# Patient Record
(demographics unavailable — no encounter records)

---

## 2024-11-05 NOTE — PROCEDURE
[Adverse Effects] : no adverse effects [FreeTextEntry1] : 5 units B/L crow's feet  40 units wasted  Botox [id number], 12/2026

## 2024-11-05 NOTE — ASSESSMENT
[FreeTextEntry1] : 51 y/o F with chronic Migraines progressively worsening and now with almost daily headaches interfering on QOL and functioning.  She is currently on Antidepressant and cannot be on TCA.  She is on ARB and BP on lower end and cannot be on betablocker.    -Botox tx performed today w/out AE  - will taper Topamax by 25 mg and monitor response. If no change in headache will decrease to 25 mg q hs, advised of AE  - Ubrelvy no longer effective and has failed multiple triptans, continue Nurtec 75 mg prn as abortive.  -cont Botox.     The patient had the opportunity to ask questions and to provide feedback. All questions were answered accordingly.  The patient verbalized understanding of the management plan.

## 2024-11-05 NOTE — PHYSICAL EXAM
[FreeTextEntry1] : Constitutional: No signs of distress. No signs of toxicity. \par  MS: Alert and well oriented. Speech fluent. No aphasia. Fund of knowledge intact. \par  Psychiatric: Mood stable.\par  CN: PERRLA: No papilledema; No VFC: No Estefany. V1-3 intact. No facial asymmetry. palate elevates symmetrically, tongue midline\par  Motor: Adequate bulk, tone, strength. 5/5 strength\par  DTR: present and symmetrical; no clonus\par  Sensory: intact to primary and secondary modalities; neg Romberg\par  Cerebellar and gait: intact\par  Eyes: no redness or swelling\par  HEENT: intact\par  Neck: No masses noted\par  Pulmonary: no respiratory distress\par  Vascular: no temperature,color changes; no edema\par  Musculoskeletal: examination of the cervical spine reveals no midline tenderness, range of motion full upon flexion, extension and lateral rotation. Negative facet tenderness, \par  Skin: No rash.\par

## 2024-11-05 NOTE — HISTORY OF PRESENT ILLNESS
[FreeTextEntry1] : 51 y/o F with Pmhx Depression, Anxiety, chronic Migraines onset at 15 years old who presents today for follow up.  Since her last visit reports headaches have improved.  She is having headaches 1-2x/day per week on average described as frontal 4-5/10, no photo and phonophobia, no N/V, no dizziness, visual changes. She has been on Topamax 75mg daily. Tried Nurtec and no benefit thus.  Triggers include weather changes, foods.    She is training training 5-6x/week, strength training, " Spartan training", spinning, running 3-4 miles. ( previously 10 miles) .   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  Father recently passed away.

## 2024-11-22 NOTE — CONSULT LETTER
[Dear  ___] : Dear  [unfilled], [Consult Letter:] : I had the pleasure of evaluating your patient, [unfilled]. [Please see my note below.] : Please see my note below. [Sincerely,] : Sincerely, [FreeTextEntry3] : Piyush Canseco MD FACS

## 2024-11-22 NOTE — ASSESSMENT
[FreeTextEntry1] : Hx bilateral implants 2014 Left breast nodule 11:00 - BIRADS 3 imaging 7/2024 Reassured patient and mother that index of suspicion for malignancy is low  Pt is scheduled for reconstruction surgery w/ Dr. Garland of plastic sx  Will review images with [facility] regarding 6 month L US 1/2025 vs biopsy of the L breast nodule  All questions answered Genetic testing performed today

## 2024-11-22 NOTE — PHYSICAL EXAM
[Normal] : supple, no neck mass and thyroid not enlarged [Normal Neck Lymph Nodes] : normal neck lymph nodes  [Normal Supraclavicular Lymph Nodes] : normal supraclavicular lymph nodes [Normal Groin Lymph Nodes] : normal groin lymph nodes [Normal Axillary Lymph Nodes] : normal axillary lymph nodes [Normal] : oriented to person, place and time, with appropriate affect [de-identified] : no masses or adenopathy bilaterally

## 2024-11-22 NOTE — PHYSICAL EXAM
[Normal] : supple, no neck mass and thyroid not enlarged [Normal Neck Lymph Nodes] : normal neck lymph nodes  [Normal Supraclavicular Lymph Nodes] : normal supraclavicular lymph nodes [Normal Groin Lymph Nodes] : normal groin lymph nodes [Normal Axillary Lymph Nodes] : normal axillary lymph nodes [Normal] : oriented to person, place and time, with appropriate affect [de-identified] : no masses or adenopathy bilaterally

## 2024-11-22 NOTE — HISTORY OF PRESENT ILLNESS
[de-identified] : Patient is a 51 y/o F who presents a chief complaint of left breast nodule seen on imaging.  Hx of bilateral saline implants in January 2014. She is scheduled for surgery with Dr. Garland of plastic sx on 12/04/24.  MMG/US 7/25/24 - Newly visualized probably benign hypoechoic lesion in the left breast 11:00 periareolar region for which a 6 month f/u recommended.  (NCI life time risk 8.0%, MMG BIRADS 2, Sono BIRADS 3) Bilateral implants without gross evidence of rupture - eval with breast MRI.  Pmhx Depression, Anxiety, chronic Migraines onset at 15 years old (f/u with neuro) Denies any family hx of breast cancer. Mother had ovarian cancer.

## 2024-11-22 NOTE — REASON FOR VISIT
[Initial Consultation] : an initial consultation for [Abnormal Mammogram] : abnormal mammogram [Parent] : parent

## 2024-11-22 NOTE — ASSESSMENT
[FreeTextEntry1] : Hx bilateral implants 2014 Left breast nodule 11:00 - BIRADS 3 imaging 7/2024 Reassured patient and mother that index of suspicion for malignancy is low  Pt is scheduled for reconstruction surgery w/ Dr. Garland of plastic sx  Will review images with [facility] regarding 6 month L US 1/2025 vs biopsy of the L breast nodule  All questions answered Genetic testing performed today

## 2024-11-22 NOTE — HISTORY OF PRESENT ILLNESS
[de-identified] : Patient is a 49 y/o F who presents a chief complaint of left breast nodule seen on imaging.  Hx of bilateral saline implants in January 2014. She is scheduled for surgery with Dr. Garland of plastic sx on 12/04/24.  MMG/US 7/25/24 - Newly visualized probably benign hypoechoic lesion in the left breast 11:00 periareolar region for which a 6 month f/u recommended.  (NCI life time risk 8.0%, MMG BIRADS 2, Sono BIRADS 3) Bilateral implants without gross evidence of rupture - eval with breast MRI.  Pmhx Depression, Anxiety, chronic Migraines onset at 15 years old (f/u with neuro) Denies any family hx of breast cancer. Mother had ovarian cancer.

## 2024-11-22 NOTE — ADDENDUM
[FreeTextEntry1] : I, Nasra Siegel, acted solely as a scribe for Dr. Piyush Canseco on this date 11/22/2024.

## 2025-02-18 NOTE — PROCEDURE
[Chronic migraine] : Chronic migraine [Consent Signed] : consent signed [BOTOX 200 Units] : BOTOX 200 units; 5 units per muscle site.  procerus x 1, corragator x 2, frontalis x 4, temporalis x 8, occipitalis x 6, cervical  paraspinal x 4 and trapezius x 6 [Adverse Effects] : no adverse effects [FreeTextEntry1] : 5 units B/L crow's feet  40 units wasted  Botox [id number], 05/2027

## 2025-02-18 NOTE — HISTORY OF PRESENT ILLNESS
[FreeTextEntry1] : 51 y/o F with Pmhx Depression, Anxiety, chronic Migraines onset at 15 years old who presents today for follow up.  Since her last visit reports viral infection, fever, chills, cough with significant increase in intensity and frequency of headaches.  She was having headaches 1-2x/day per week on average described as frontal 4-5/10, no photo and phonophobia, no N/V, no dizziness, visual changes. She has been on Topamax 75mg daily. Tried Nurtec and no benefit thus.  Triggers include weather changes, foods.    She is training training 5-6x/week, strength training, " Spartan training", spinning, running 3-4 miles. ( previously 10 miles) .   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  Father recently passed away.

## 2025-02-18 NOTE — ASSESSMENT
[FreeTextEntry1] : 49 y/o F with chronic Migraines progressively worsening and now with almost daily headaches interfering on QOL and functioning.  She is currently on Antidepressant and cannot be on TCA.  She is on ARB and BP on lower end and cannot be on betablocker.    -Botox tx performed today w/out AE  nabumetone 750 mg 2x/day with meals x 7-10 days then prn.  - will taper Topamax by 25 mg and monitor response. If no change in headache will decrease to 25 mg q hs, advised of AE  - Ubrelvy no longer effective and has failed multiple triptans, continue Nurtec 75 mg prn as abortive.  -cont Botox.     The patient had the opportunity to ask questions and to provide feedback. All questions were answered accordingly.  The patient verbalized understanding of the management plan.

## 2025-05-16 NOTE — PROCEDURE
[Chronic migraine] : Chronic migraine [Consent Signed] : consent signed [Adverse Effects] : no adverse effects [BOTOX 200 Units] : BOTOX 200 units; 5 units per muscle site.  procerus x 1, corragator x 2, frontalis x 4, temporalis x 8, occipitalis x 6, cervical  paraspinal x 4 and trapezius x 6 [FreeTextEntry1] : 5 units B/L crow's feet  40 units wasted  Botox [id number], 10/2027

## 2025-05-16 NOTE — HISTORY OF PRESENT ILLNESS
[FreeTextEntry1] : 50 y/o F with Pmhx Depression, Anxiety, chronic Migraines onset at 15 years old who presents today for follow up.  Since her last visit reports viral infection, fever, chills, cough with significant increase in intensity and frequency of headaches.  Under a lot of stress the last few weeks as daughter s/p TBI with ? [facility]- [facility] after fall from motorized scooter, now dc home.  She was having headaches 1-2x/day per week on average described as frontal 4-5/10, no photo and phonophobia, no N/V, no dizziness, visual changes. She has been on Topamax 75mg daily. Tried Nurtec and no benefit thus.  Triggers include weather changes, foods.    She is training training 5-6x/week, strength training, " Spartan training", spinning, running 3-4 miles. ( previously 10 miles) .   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  Father recently passed away.

## 2025-05-16 NOTE — END OF VISIT
[Time Spent: ___ minutes] : I have spent [unfilled] minutes of time on the encounter which excludes teaching and separately reported services.
Detail Level: Zone
Additional Notes: Reassured pt wound is healing appropriately. Slight dehiscence in center of wound on exam. Minimal serosanginous drainage present. Culture obtained. Will contact pt once results received. Pt to continue wound care as instructed with Vaseline and a bandage daily. RTC as scheduled.